# Patient Record
Sex: FEMALE | Race: BLACK OR AFRICAN AMERICAN | URBAN - METROPOLITAN AREA
[De-identification: names, ages, dates, MRNs, and addresses within clinical notes are randomized per-mention and may not be internally consistent; named-entity substitution may affect disease eponyms.]

---

## 2022-10-31 ENCOUNTER — OFFICE VISIT (OUTPATIENT)
Dept: URBAN - METROPOLITAN AREA CLINIC 17 | Facility: CLINIC | Age: 32
End: 2022-10-31

## 2022-12-09 ENCOUNTER — WEB ENCOUNTER (OUTPATIENT)
Dept: URBAN - METROPOLITAN AREA CLINIC 17 | Facility: CLINIC | Age: 32
End: 2022-12-09

## 2022-12-09 ENCOUNTER — OFFICE VISIT (OUTPATIENT)
Dept: URBAN - METROPOLITAN AREA CLINIC 17 | Facility: CLINIC | Age: 32
End: 2022-12-09
Payer: OTHER GOVERNMENT

## 2022-12-09 ENCOUNTER — DASHBOARD ENCOUNTERS (OUTPATIENT)
Age: 32
End: 2022-12-09

## 2022-12-09 VITALS
HEIGHT: 67 IN | SYSTOLIC BLOOD PRESSURE: 116 MMHG | WEIGHT: 205 LBS | BODY MASS INDEX: 32.18 KG/M2 | HEART RATE: 64 BPM | DIASTOLIC BLOOD PRESSURE: 73 MMHG | TEMPERATURE: 97.6 F

## 2022-12-09 DIAGNOSIS — E73.8 OTHER LACTOSE INTOLERANCE: ICD-10-CM

## 2022-12-09 DIAGNOSIS — K21.00 GASTROESOPHAGEAL REFLUX DISEASE WITH ESOPHAGITIS WITHOUT HEMORRHAGE: ICD-10-CM

## 2022-12-09 DIAGNOSIS — R10.32 LLQ ABDOMINAL PAIN: ICD-10-CM

## 2022-12-09 DIAGNOSIS — F41.9 ANXIETY: ICD-10-CM

## 2022-12-09 DIAGNOSIS — K92.89 GAS BLOAT SYNDROME: ICD-10-CM

## 2022-12-09 PROBLEM — 48694002: Status: ACTIVE | Noted: 2022-12-09

## 2022-12-09 PROBLEM — 782415009: Status: ACTIVE | Noted: 2022-12-09

## 2022-12-09 PROCEDURE — 99244 OFF/OP CNSLTJ NEW/EST MOD 40: CPT | Performed by: INTERNAL MEDICINE

## 2022-12-09 RX ORDER — LINACLOTIDE 72 UG/1
1 CAPSULE AT LEAST 30 MINUTES BEFORE THE FIRST MEAL OF THE DAY ON AN EMPTY STOMACH CAPSULE, GELATIN COATED ORAL ONCE A DAY
Qty: 90 | Refills: 3 | OUTPATIENT
Start: 2022-12-09 | End: 2023-12-04

## 2022-12-09 NOTE — PHYSICAL EXAM GASTROINTESTINAL
Abdomen , soft,tenderness in LLQ pain , nondistended , no guarding or rigidity , no masses palpable , normal bowel sounds , Liver and Spleen , no hepatomegaly present , no hepatosplenomegaly , liver nontender , spleen not palpable

## 2022-12-09 NOTE — HPI-TODAY'S VISIT:
The patient has a hiistory of GERD, dyspepsia who presents for evaluatoin of gas and bloating with constipation. The pt notes that she has increased gas, bloating after eating fatty foods. Pt will have increased gas and bloating and LLQ pain. She denies melena, hematemeisiis or hematochezia.    The patient's consultation note will be sent to the referring MD, Dr. Nelson Arciniega.

## 2022-12-28 PROBLEM — 266433003: Status: ACTIVE | Noted: 2022-12-09

## 2023-01-12 ENCOUNTER — OFFICE VISIT (OUTPATIENT)
Dept: URBAN - METROPOLITAN AREA CLINIC 16 | Facility: CLINIC | Age: 33
End: 2023-01-12
Payer: OTHER GOVERNMENT

## 2023-01-12 DIAGNOSIS — K21.9 GERD: ICD-10-CM

## 2023-01-12 DIAGNOSIS — R10.32 LLQ ABDOMINAL PAIN: ICD-10-CM

## 2023-01-12 PROCEDURE — 76700 US EXAM ABDOM COMPLETE: CPT | Performed by: INTERNAL MEDICINE

## 2023-01-12 RX ORDER — LINACLOTIDE 72 UG/1
1 CAPSULE AT LEAST 30 MINUTES BEFORE THE FIRST MEAL OF THE DAY ON AN EMPTY STOMACH CAPSULE, GELATIN COATED ORAL ONCE A DAY
Qty: 90 | Refills: 3 | Status: ACTIVE | COMMUNITY
Start: 2022-12-09 | End: 2023-12-04

## 2023-01-13 PROBLEM — 235595009 GASTROESOPHAGEAL REFLUX DISEASE: Status: ACTIVE | Noted: 2023-01-13

## 2023-01-25 PROBLEM — 119292006: Status: ACTIVE | Noted: 2023-01-25

## 2023-01-25 PROBLEM — 301716002: Status: ACTIVE | Noted: 2023-01-25

## 2023-05-15 ENCOUNTER — WEB ENCOUNTER (OUTPATIENT)
Dept: URBAN - METROPOLITAN AREA SURGERY CENTER 16 | Facility: SURGERY CENTER | Age: 33
End: 2023-05-15

## 2023-05-16 ENCOUNTER — OFFICE VISIT (OUTPATIENT)
Dept: URBAN - METROPOLITAN AREA SURGERY CENTER 16 | Facility: SURGERY CENTER | Age: 33
End: 2023-05-16
Payer: OTHER GOVERNMENT

## 2023-05-16 DIAGNOSIS — R19.4 ALTERATION IN BOWEL ELIMINATION: ICD-10-CM

## 2023-05-16 DIAGNOSIS — R10.32 ABDOMINAL CRAMPING IN LEFT LOWER QUADRANT: ICD-10-CM

## 2023-05-16 PROCEDURE — G8907 PT DOC NO EVENTS ON DISCHARG: HCPCS | Performed by: INTERNAL MEDICINE

## 2023-05-16 PROCEDURE — 45378 DIAGNOSTIC COLONOSCOPY: CPT | Performed by: INTERNAL MEDICINE
